# Patient Record
Sex: FEMALE | Race: WHITE | URBAN - METROPOLITAN AREA
[De-identification: names, ages, dates, MRNs, and addresses within clinical notes are randomized per-mention and may not be internally consistent; named-entity substitution may affect disease eponyms.]

---

## 2018-08-01 ENCOUNTER — OFFICE VISIT (OUTPATIENT)
Dept: OBGYN CLINIC | Age: 28
End: 2018-08-01

## 2018-08-01 VITALS — WEIGHT: 153 LBS | DIASTOLIC BLOOD PRESSURE: 62 MMHG | SYSTOLIC BLOOD PRESSURE: 118 MMHG | RESPIRATION RATE: 14 BRPM

## 2018-08-01 DIAGNOSIS — N91.2 AMENORRHEA: Primary | ICD-10-CM

## 2018-08-01 RX ORDER — NORETHINDRONE ACETATE AND ETHINYL ESTRADIOL .03; 1.5 MG/1; MG/1
TABLET ORAL
COMMUNITY

## 2018-08-01 RX ORDER — LEVOTHYROXINE SODIUM 50 UG/1
TABLET ORAL
COMMUNITY

## 2018-08-01 NOTE — PROGRESS NOTES
Ivon Qiu is a 29 y.o. female    Chief Complaint   Patient presents with    Menstrual Problem          Ivon Qiu is a 29 y.o. female who complains of  Amenorrhea associated with long term OCP use    Her current method of family planning is oral  contraceptive. The patient is sexually active. She developed this problem approximately 2 years ago. Her relevant past medical history:   History reviewed. No pertinent past medical history. History reviewed. No pertinent surgical history. Social History     Occupational History    Not on file. Social History Main Topics    Smoking status: Never Smoker    Smokeless tobacco: Never Used    Alcohol use Not on file    Drug use: Not on file    Sexual activity: Not on file     No family history on file. No Known Allergies  Prior to Admission medications    Medication Sig Start Date End Date Taking? Authorizing Provider   norethindrone ac-eth estradiol (MICROGESTIN 1.5/30, 21,) 1.5-30 mg-mcg tab Take  by mouth.    Yes Historical Provider        Review of Systems - History obtained from the patient  Constitutional: negative for weight loss, fever, night sweats  HEENT: negative for hearing loss, earache, congestion, snoring, sorethroat  CV: negative for chest pain, palpitations, edema  Resp: negative for cough, shortness of breath, wheezing  Breast: negative for breast lumps, nipple discharge, galactorrhea  GI: negative for change in bowel habits, abdominal pain, black or bloody stools  : negative for frequency, dysuria, hematuria  MSK: negative for back pain, joint pain, muscle pain  Skin: negative for itching, rash, hives  Neuro: negative for dizziness, headache, confusion, weakness  Psych: negative for anxiety, depression, change in mood  Heme/lymph: negative for bleeding, bruising, pallor      Objective:  Visit Vitals    /62    Resp 14    Wt 153 lb (69.4 kg)          PHYSICAL EXAMINATION    Constitutional  · Appearance: well-nourished, well developed, alert, in no acute distress    HENT  · Head and Face: appears normal    Neck  · Inspection/Palpation: normal appearance, no masses or tenderness  · Lymph Nodes: no lymphadenopathy present  · Thyroid: gland size normal, nontender, no nodules or masses present on palpation  ·   Breasts  · Inspection of Breasts: breasts symmetrical, no skin changes, no discharge present, nipple appearance normal, no skin retraction present  · Palpation of Breasts and Axillae: no masses present on palpation, no breast tenderness  · Axillary Lymph Nodes: no lymphadenopathy present    Gastrointestinal  · Abdominal Examination: abdomen non-tender to palpation, normal bowel sounds, no masses present  · Liver and spleen: no hepatomegaly present, spleen not palpable  · Hernias: no hernias identified    Genitourinary  · External Genitalia: normal appearance for age, no discharge present, no tenderness present, no inflammatory lesions present, no masses present, no atrophy present  · Vagina: normal vaginal vault without central or paravaginal defects, no discharge present, no inflammatory lesions present, no masses present  · Bladder: non-tender to palpation  · Urethra: appears normal  · Cervix: normal   · Uterus: normal size, shape and consistency  · Adnexa: no adnexal tenderness present, no adnexal masses present  · Perineum: perineum within normal limits, no evidence of trauma, no rashes or skin lesions present  · Anus: anus within normal limits, no hemorrhoids present  · Inguinal Lymph Nodes: no lymphadenopathy present    Skin  · General Inspection: no rash, no lesions identified    Neurologic/Psychiatric  · Mental Status:  · Orientation: grossly oriented to person, place and time  · Mood and Affect: mood normal, affect appropriate    Assessment:    amenorrhea assoc with ocp    Plan:   Reassured of this situation  Discussed all contraceptive options and their impact regarding libido  Conservative management fully reviewed and all questions answered.

## 2018-08-02 ENCOUNTER — TELEPHONE (OUTPATIENT)
Dept: OBGYN CLINIC | Age: 28
End: 2018-08-02

## 2018-08-02 NOTE — TELEPHONE ENCOUNTER
Message left at 10:08AM      29year old patient seen in the office yesterday for problem visit. Patient left a message that she wanted to make sure that her visit was not billed as a well visit. This nurse left a detailed message that her visit was coded as a problem visit. This nurse advised that the patient call back if further questions. Seda Mcbride

## 2023-08-23 LAB
ABO, EXTERNAL RESULT: NORMAL
C. TRACHOMATIS, EXTERNAL RESULT: NEGATIVE
HEP B, EXTERNAL RESULT: NEGATIVE
HIV, EXTERNAL RESULT: NONREACTIVE
N. GONORRHOEAE, EXTERNAL RESULT: NEGATIVE
RUBELLA TITER, EXTERNAL RESULT: NORMAL
T. PALLIDUM (SYPHILIS) ANTIBODY, EXTERNAL RESULT: NONREACTIVE

## 2024-03-01 LAB — GBS, EXTERNAL RESULT: NEGATIVE

## 2024-03-18 ENCOUNTER — HOSPITAL ENCOUNTER (INPATIENT)
Facility: HOSPITAL | Age: 34
LOS: 4 days | Discharge: HOME | End: 2024-03-22
Attending: OBSTETRICS & GYNECOLOGY | Admitting: STUDENT IN AN ORGANIZED HEALTH CARE EDUCATION/TRAINING PROGRAM
Payer: COMMERCIAL

## 2024-03-18 PROBLEM — E03.9 HYPOTHYROIDISM: Status: ACTIVE | Noted: 2024-03-18

## 2024-03-18 PROBLEM — O40.3XX0 POLYHYDRAMNIOS AFFECTING PREGNANCY IN THIRD TRIMESTER: Status: ACTIVE | Noted: 2024-03-18

## 2024-03-18 PROBLEM — Z3A.38 38 WEEKS GESTATION OF PREGNANCY: Status: ACTIVE | Noted: 2024-03-18

## 2024-03-18 PROBLEM — O13.3 GESTATIONAL HYPERTENSION, THIRD TRIMESTER: Status: ACTIVE | Noted: 2024-03-18

## 2024-03-18 PROBLEM — R03.0 ELEVATED BP WITHOUT DIAGNOSIS OF HYPERTENSION: Status: ACTIVE | Noted: 2024-03-18

## 2024-03-18 PROBLEM — E66.9 OBESITY (BMI 30-39.9): Status: ACTIVE | Noted: 2024-03-18

## 2024-03-18 LAB
ALBUMIN SERPL-MCNC: 3.2 G/DL (ref 3.5–5)
ALBUMIN/GLOB SERPL: 0.9 (ref 1.1–2.2)
ALP SERPL-CCNC: 119 U/L (ref 45–117)
ALT SERPL-CCNC: 19 U/L (ref 12–78)
ANION GAP SERPL CALC-SCNC: 8 MMOL/L (ref 5–15)
AST SERPL-CCNC: 18 U/L (ref 15–37)
BASOPHILS # BLD: 0.1 K/UL (ref 0–0.1)
BASOPHILS NFR BLD: 1 % (ref 0–1)
BILIRUB SERPL-MCNC: 0.2 MG/DL (ref 0.2–1)
BUN SERPL-MCNC: 9 MG/DL (ref 6–20)
BUN/CREAT SERPL: 14 (ref 12–20)
CALCIUM SERPL-MCNC: 9.4 MG/DL (ref 8.5–10.1)
CHLORIDE SERPL-SCNC: 102 MMOL/L (ref 97–108)
CO2 SERPL-SCNC: 24 MMOL/L (ref 21–32)
CREAT SERPL-MCNC: 0.64 MG/DL (ref 0.55–1.02)
CREAT UR-MCNC: 102 MG/DL
DIFFERENTIAL METHOD BLD: ABNORMAL
EOSINOPHIL # BLD: 0.1 K/UL (ref 0–0.4)
EOSINOPHIL NFR BLD: 1 % (ref 0–7)
ERYTHROCYTE [DISTWIDTH] IN BLOOD BY AUTOMATED COUNT: 13.4 % (ref 11.5–14.5)
GLOBULIN SER CALC-MCNC: 3.6 G/DL (ref 2–4)
GLUCOSE SERPL-MCNC: 94 MG/DL (ref 65–100)
HCT VFR BLD AUTO: 39.1 % (ref 35–47)
HGB BLD-MCNC: 13.5 G/DL (ref 11.5–16)
IMM GRANULOCYTES # BLD AUTO: 0.1 K/UL (ref 0–0.04)
IMM GRANULOCYTES NFR BLD AUTO: 1 % (ref 0–0.5)
LYMPHOCYTES # BLD: 2.2 K/UL (ref 0.8–3.5)
LYMPHOCYTES NFR BLD: 17 % (ref 12–49)
MCH RBC QN AUTO: 31.5 PG (ref 26–34)
MCHC RBC AUTO-ENTMCNC: 34.5 G/DL (ref 30–36.5)
MCV RBC AUTO: 91.1 FL (ref 80–99)
MONOCYTES # BLD: 0.8 K/UL (ref 0–1)
MONOCYTES NFR BLD: 6 % (ref 5–13)
NEUTS SEG # BLD: 9.2 K/UL (ref 1.8–8)
NEUTS SEG NFR BLD: 74 % (ref 32–75)
NRBC # BLD: 0 K/UL (ref 0–0.01)
NRBC BLD-RTO: 0 PER 100 WBC
PLATELET # BLD AUTO: 181 K/UL (ref 150–400)
PMV BLD AUTO: 12.2 FL (ref 8.9–12.9)
POTASSIUM SERPL-SCNC: 3.7 MMOL/L (ref 3.5–5.1)
PROT SERPL-MCNC: 6.8 G/DL (ref 6.4–8.2)
PROT UR-MCNC: 19 MG/DL (ref 0–11.9)
PROT/CREAT UR-RTO: 0.2
RBC # BLD AUTO: 4.29 M/UL (ref 3.8–5.2)
SODIUM SERPL-SCNC: 134 MMOL/L (ref 136–145)
WBC # BLD AUTO: 12.4 K/UL (ref 3.6–11)

## 2024-03-18 PROCEDURE — 7210000100 HC LABOR FEE PER 1 HR

## 2024-03-18 PROCEDURE — 59200 INSERT CERVICAL DILATOR: CPT

## 2024-03-18 PROCEDURE — 6370000000 HC RX 637 (ALT 250 FOR IP): Performed by: STUDENT IN AN ORGANIZED HEALTH CARE EDUCATION/TRAINING PROGRAM

## 2024-03-18 PROCEDURE — 86901 BLOOD TYPING SEROLOGIC RH(D): CPT

## 2024-03-18 PROCEDURE — 1100000000 HC RM PRIVATE

## 2024-03-18 PROCEDURE — 86592 SYPHILIS TEST NON-TREP QUAL: CPT

## 2024-03-18 PROCEDURE — 86850 RBC ANTIBODY SCREEN: CPT

## 2024-03-18 PROCEDURE — 80053 COMPREHEN METABOLIC PANEL: CPT

## 2024-03-18 PROCEDURE — 82570 ASSAY OF URINE CREATININE: CPT

## 2024-03-18 PROCEDURE — 36415 COLL VENOUS BLD VENIPUNCTURE: CPT

## 2024-03-18 PROCEDURE — 86900 BLOOD TYPING SEROLOGIC ABO: CPT

## 2024-03-18 PROCEDURE — 85025 COMPLETE CBC W/AUTO DIFF WBC: CPT

## 2024-03-18 PROCEDURE — 84156 ASSAY OF PROTEIN URINE: CPT

## 2024-03-18 RX ORDER — HYDROMORPHONE HYDROCHLORIDE 1 MG/ML
1 INJECTION, SOLUTION INTRAMUSCULAR; INTRAVENOUS; SUBCUTANEOUS EVERY 4 HOURS PRN
Status: DISCONTINUED | OUTPATIENT
Start: 2024-03-18 | End: 2024-03-21

## 2024-03-18 RX ORDER — ZOLPIDEM TARTRATE 5 MG/1
5 TABLET ORAL NIGHTLY PRN
Status: DISPENSED | OUTPATIENT
Start: 2024-03-18 | End: 2024-03-19

## 2024-03-18 RX ORDER — SODIUM CHLORIDE, SODIUM LACTATE, POTASSIUM CHLORIDE, AND CALCIUM CHLORIDE .6; .31; .03; .02 G/100ML; G/100ML; G/100ML; G/100ML
1000 INJECTION, SOLUTION INTRAVENOUS PRN
Status: DISCONTINUED | OUTPATIENT
Start: 2024-03-18 | End: 2024-03-21

## 2024-03-18 RX ORDER — SODIUM CHLORIDE 9 MG/ML
25 INJECTION, SOLUTION INTRAVENOUS PRN
Status: DISCONTINUED | OUTPATIENT
Start: 2024-03-18 | End: 2024-03-21

## 2024-03-18 RX ORDER — SODIUM CHLORIDE 0.9 % (FLUSH) 0.9 %
5-40 SYRINGE (ML) INJECTION PRN
Status: DISCONTINUED | OUTPATIENT
Start: 2024-03-18 | End: 2024-03-21

## 2024-03-18 RX ORDER — TERBUTALINE SULFATE 1 MG/ML
0.25 INJECTION, SOLUTION SUBCUTANEOUS
Status: ACTIVE | OUTPATIENT
Start: 2024-03-18 | End: 2024-03-19

## 2024-03-18 RX ORDER — VITAMIN B COMPLEX
1 CAPSULE ORAL DAILY
COMMUNITY

## 2024-03-18 RX ORDER — VITAMIN E (DL,TOCOPHERYL ACET) 45 MG/0.25
DROPS ORAL
COMMUNITY

## 2024-03-18 RX ORDER — ACETAMINOPHEN 325 MG/1
650 TABLET ORAL EVERY 4 HOURS PRN
Status: DISCONTINUED | OUTPATIENT
Start: 2024-03-18 | End: 2024-03-21

## 2024-03-18 RX ORDER — SODIUM CHLORIDE 0.9 % (FLUSH) 0.9 %
5-40 SYRINGE (ML) INJECTION EVERY 12 HOURS SCHEDULED
Status: DISCONTINUED | OUTPATIENT
Start: 2024-03-18 | End: 2024-03-21

## 2024-03-18 RX ORDER — SODIUM CHLORIDE, SODIUM LACTATE, POTASSIUM CHLORIDE, AND CALCIUM CHLORIDE .6; .31; .03; .02 G/100ML; G/100ML; G/100ML; G/100ML
500 INJECTION, SOLUTION INTRAVENOUS PRN
Status: DISCONTINUED | OUTPATIENT
Start: 2024-03-18 | End: 2024-03-21

## 2024-03-18 RX ORDER — LEVOTHYROXINE SODIUM 75 UG/ML
SOLUTION ORAL
COMMUNITY

## 2024-03-18 RX ADMIN — Medication 25 MCG: at 19:30

## 2024-03-18 NOTE — H&P
History & Physical    Name: Dawn Means MRN: [id number]  SSN: [id number]    YOB: 1990  Age: 33 y.o.  Sex: female      Subjective:     Chief Complaint:  IOL    HPI:  Patient presents to L&D for scheduled IOL. She is doing well. Reports good FM. Notes feeling some mild tightening/cramping in lower abd today, intermittent, no pattern. Denies VB, LOF. Also denies HA, vision changes, CP, SOB, RUQ pain, new/worsening swelling.    Last growth US 3/8: EFW 3157g/57%ile, AC 70%ile    Estimated Date of Delivery: None noted.  OB History    Para Term  AB Living   1             SAB IAB Ectopic Molar Multiple Live Births                    # Outcome Date GA Lbr Yohannes/2nd Weight Sex Delivery Anes PTL Lv   1 Current                No past medical history on file.  No past surgical history on file.  Social History     Occupational History    Not on file   Tobacco Use    Smoking status: Not on file    Smokeless tobacco: Not on file   Substance and Sexual Activity    Alcohol use: Not on file    Drug use: Not on file    Sexual activity: Not on file     No family history on file.    Allergies   Allergen Reactions    Alpha-Gal      Prior to Admission medications    Not on File        Review of Systems: Pertinent items are noted in the History of Present Illness.    Objective:     Vitals:  Vitals:    24 1734   BP: (!) 140/86   Pulse: 72         BP (!) 140/86   Pulse 72     Gen: Alert, NAD even when pau   Lungs:  Respiratory non labored. Lungs CTAB   Heart:  Regular rate and rhythm, no abnormal heart sounds.   Abd: Soft, non-tender. Gravid.   Presentation: Cephalic via V-scan   Genital: Cervix 1/70/-3   Msk: Symmetric 1+ pitting edema bilateral lower legs. No calf tenderness.      NST:  Baseline : 150 bpm  Variability: moderate  Accels: present  Decels: none  [place]: mostly irritability, irregular ctx      Impression/Plan:     Principal Problem:    38 weeks gestation of pregnancy  Active Problems:

## 2024-03-18 NOTE — PROGRESS NOTES
1710 Pt of Dr. Preston arrives ambulatory with significant other for scheduled induction. Pt is , GA 38w5d. PMH significant for GHTN and thyroid disease. Pt has Alpha-gal allergy and takes daily PNV. Pt denies LOF, VB, or contractions, endorses (+) fetal movement. Pt denies headache, blurred vision, or RUQ pain. MD notified of pt arrival. Pt oriented to room and call bell within reach.    1744 Dr. Jansen at bedside to assess pt and discuss plan of care. Vertex position confirmed via vscan.    1750 Bedside and Verbal shift change report given to ALBERT Guzman (oncoming nurse) by LANDEN Bess (offgoing nurse). Report included the following information Nurse Handoff Report, Intake/Output, MAR, and Recent Results.

## 2024-03-18 NOTE — PROGRESS NOTES
1700~  Patient arrived from home for IOL for pre E, and poly hydramnios.      1755~  Dr Jansen in to see patient, explained the types of cervical ripening.  SVE 1/70    1800~  Patient would like oral cytotec.

## 2024-03-19 ENCOUNTER — ANESTHESIA EVENT (OUTPATIENT)
Facility: HOSPITAL | Age: 34
End: 2024-03-19
Payer: COMMERCIAL

## 2024-03-19 ENCOUNTER — ANESTHESIA (OUTPATIENT)
Facility: HOSPITAL | Age: 34
End: 2024-03-19
Payer: COMMERCIAL

## 2024-03-19 LAB
ABO + RH BLD: NORMAL
BLOOD GROUP ANTIBODIES SERPL: NORMAL
RPR SER QL: NONREACTIVE
SPECIMEN EXP DATE BLD: NORMAL

## 2024-03-19 PROCEDURE — 1100000000 HC RM PRIVATE

## 2024-03-19 PROCEDURE — 2580000003 HC RX 258: Performed by: OBSTETRICS & GYNECOLOGY

## 2024-03-19 PROCEDURE — 2580000003 HC RX 258: Performed by: STUDENT IN AN ORGANIZED HEALTH CARE EDUCATION/TRAINING PROGRAM

## 2024-03-19 PROCEDURE — 59200 INSERT CERVICAL DILATOR: CPT

## 2024-03-19 PROCEDURE — 36415 COLL VENOUS BLD VENIPUNCTURE: CPT

## 2024-03-19 PROCEDURE — 6370000000 HC RX 637 (ALT 250 FOR IP): Performed by: STUDENT IN AN ORGANIZED HEALTH CARE EDUCATION/TRAINING PROGRAM

## 2024-03-19 PROCEDURE — 6370000000 HC RX 637 (ALT 250 FOR IP): Performed by: ADVANCED PRACTICE MIDWIFE

## 2024-03-19 PROCEDURE — 6360000002 HC RX W HCPCS: Performed by: OBSTETRICS & GYNECOLOGY

## 2024-03-19 PROCEDURE — C1726 CATH, BAL DIL, NON-VASCULAR: HCPCS

## 2024-03-19 PROCEDURE — 7210000100 HC LABOR FEE PER 1 HR

## 2024-03-19 RX ORDER — MISOPROSTOL 200 UG/1
400 TABLET ORAL PRN
Status: DISCONTINUED | OUTPATIENT
Start: 2024-03-19 | End: 2024-03-21

## 2024-03-19 RX ORDER — DIPHENHYDRAMINE HCL 25 MG
25 CAPSULE ORAL EVERY 4 HOURS PRN
Status: DISCONTINUED | OUTPATIENT
Start: 2024-03-19 | End: 2024-03-21

## 2024-03-19 RX ORDER — ONDANSETRON 2 MG/ML
4 INJECTION INTRAMUSCULAR; INTRAVENOUS EVERY 6 HOURS PRN
Status: DISCONTINUED | OUTPATIENT
Start: 2024-03-19 | End: 2024-03-21

## 2024-03-19 RX ORDER — METHYLERGONOVINE MALEATE 0.2 MG/ML
200 INJECTION INTRAVENOUS PRN
Status: DISCONTINUED | OUTPATIENT
Start: 2024-03-19 | End: 2024-03-21

## 2024-03-19 RX ORDER — CARBOPROST TROMETHAMINE 250 UG/ML
250 INJECTION, SOLUTION INTRAMUSCULAR PRN
Status: DISCONTINUED | OUTPATIENT
Start: 2024-03-19 | End: 2024-03-21

## 2024-03-19 RX ORDER — NALOXONE HYDROCHLORIDE 0.4 MG/ML
INJECTION, SOLUTION INTRAMUSCULAR; INTRAVENOUS; SUBCUTANEOUS PRN
Status: DISCONTINUED | OUTPATIENT
Start: 2024-03-19 | End: 2024-03-21

## 2024-03-19 RX ORDER — ONDANSETRON 4 MG/1
4 TABLET, ORALLY DISINTEGRATING ORAL EVERY 6 HOURS PRN
Status: DISCONTINUED | OUTPATIENT
Start: 2024-03-19 | End: 2024-03-21

## 2024-03-19 RX ORDER — EPHEDRINE SULFATE 50 MG/ML
10 INJECTION INTRAVENOUS
Status: DISPENSED | OUTPATIENT
Start: 2024-03-19 | End: 2024-03-20

## 2024-03-19 RX ORDER — FENTANYL/BUPIVACAINE/NS/PF 2-1250MCG
1-15 PLASTIC BAG, INJECTION (ML) INJECTION CONTINUOUS
Status: DISCONTINUED | OUTPATIENT
Start: 2024-03-19 | End: 2024-03-21

## 2024-03-19 RX ORDER — SODIUM CHLORIDE, SODIUM LACTATE, POTASSIUM CHLORIDE, CALCIUM CHLORIDE 600; 310; 30; 20 MG/100ML; MG/100ML; MG/100ML; MG/100ML
INJECTION, SOLUTION INTRAVENOUS CONTINUOUS
Status: DISCONTINUED | OUTPATIENT
Start: 2024-03-19 | End: 2024-03-21

## 2024-03-19 RX ADMIN — Medication 25 MCG: at 17:56

## 2024-03-19 RX ADMIN — SODIUM CHLORIDE, PRESERVATIVE FREE 10 ML: 5 INJECTION INTRAVENOUS at 07:55

## 2024-03-19 RX ADMIN — ACETAMINOPHEN 325 MG: 325 TABLET ORAL at 01:29

## 2024-03-19 RX ADMIN — SODIUM CHLORIDE, POTASSIUM CHLORIDE, SODIUM LACTATE AND CALCIUM CHLORIDE: 600; 310; 30; 20 INJECTION, SOLUTION INTRAVENOUS at 22:18

## 2024-03-19 RX ADMIN — Medication 25 MCG: at 04:15

## 2024-03-19 RX ADMIN — OXYTOCIN 2 MILLI-UNITS/MIN: 10 INJECTION, SOLUTION INTRAMUSCULAR; INTRAVENOUS at 22:20

## 2024-03-19 RX ADMIN — Medication 25 MCG: at 09:16

## 2024-03-19 RX ADMIN — ZOLPIDEM TARTRATE 5 MG: 5 TABLET ORAL at 01:29

## 2024-03-19 RX ADMIN — Medication 25 MCG: at 13:34

## 2024-03-19 RX ADMIN — Medication 25 MCG: at 00:06

## 2024-03-19 ASSESSMENT — PAIN SCALES - GENERAL: PAINLEVEL_OUTOF10: 4

## 2024-03-19 ASSESSMENT — PAIN DESCRIPTION - DESCRIPTORS: DESCRIPTORS: ACHING

## 2024-03-19 ASSESSMENT — PAIN DESCRIPTION - LOCATION: LOCATION: HEAD

## 2024-03-19 NOTE — PROGRESS NOTES
S: Pt seen, examined and chart reviewed.  Comfortable.    O:   Vitals:    03/19/24 0755   BP: 136/87   Pulse: 75   Resp: 16   Temp: 98 °F (36.7 °C)   SpO2: 98%     Cat I NST  An NST was performed and was reactive. The baseline FHR was 135. Moderate baseline  variability was noted. Accelerations of sufficient amplitude and duration were noted.  There were no decelerations noted.    [place] - irreg    Gen - NAD  Resp - nl effort  Abd - gravid, non-tender  Cervix - 1/90/-2    Balloon placed manually, pt tolerated well.  Given significant effacement only uterine balloon was inflated to 60cc.      A/P: G1 at 38w6d undergoing IOL for GHTN and polyhydramnios.  S/p cytotec x 3.  GBS neg.  H/o hypothyroidism, not on meds.    - reviewed options of continued cytotec +/- CRB placement  - after discussion, mutual decision is made to proceed with CRB, tolerated placement well  - cont cytotec for up to 3more doses while balloon remains in place  - monitor Bps, currently mild range

## 2024-03-19 NOTE — PROGRESS NOTES
1530 verbal report from ROZINA Arriaza RN  [id number] [facility]  1940 verbal report given to SERENITY Vu RN

## 2024-03-19 NOTE — PROGRESS NOTES
Increasing cramping  Normotensive  Cat I NST  Balloon removed intact  Cervix 6/90/-1  Last cytotec 1800  Plan to start pit 2200

## 2024-03-19 NOTE — PROGRESS NOTES
1935: Report received from KENDELL Guzman RN    0115: Pt sitting up in bed, maternal HR audible. Pt c/o headache rated 4/10 and unable to sleep. Requesting tylenol and something for sleep

## 2024-03-19 NOTE — PROGRESS NOTES
Labor Progress Note    S: Patient seen, fetal heart rate and contraction pattern evaluated. Resting comfortably in bed eating dinner. Partner at bedside.    Physical Exam:  Patient Vitals for the past 4 hrs:   Temp Pulse BP   24 1931 97.9 °F (36.6 °C) 67 128/79   24 1734 -- 72 (!) 140/86         Cervical Exam: deferred  Membranes:  Intact  Uterine Contractions:  Frequency: Irregular, mild  Fetal Heart Rate: 135s, accels present, decels absent, moderate variability      Assessment/Plan:    33 y.o.  at 38w5d IUP  IOL polyhydramnios- 36cm on 3/15, EFW 3157g/57%ile, AC 70%ile   Cat 1 tracing  GBS negative  GHTN dx on admission, labs nl, p/cr 0.2    P:  Assumed care of patient  Introduced self to patient/partner  Continue plan per Dr. Jansen- patient does not want CRB  Cytotec overnight- will re-evaluate in AM  IV pain meds/epidural prn  All questions asked/answered and patient/partner agree with plan    ROBERTA Yeager - [facility]

## 2024-03-19 NOTE — PROGRESS NOTES
0730: Verbal report from SERENITY Vu RN.  0755: Pt up to BR, returned to bed, resting comfortably.   0910: Cervical exam 1/90/-2. Plan to place garzon balloon and continue cytotec. Pt agreeable w/ plan of care.   0916: Garzon balloon placed, 60 ml in uterine balloon. Fourth dose of cytotec given (see MAR).   1101: Up to BR.  1334: Fifth dose of cytotec given (see MAR). Resting comfortably.   1530: Verbal report given to DYANA Keenan RN and AMIRA Curtis RN.

## 2024-03-20 PROCEDURE — 7220000101 HC DELIVERY VAGINAL/SINGLE

## 2024-03-20 PROCEDURE — 6360000002 HC RX W HCPCS: Performed by: STUDENT IN AN ORGANIZED HEALTH CARE EDUCATION/TRAINING PROGRAM

## 2024-03-20 PROCEDURE — 6360000002 HC RX W HCPCS: Performed by: ANESTHESIOLOGY

## 2024-03-20 PROCEDURE — 00HU33Z INSERTION OF INFUSION DEVICE INTO SPINAL CANAL, PERCUTANEOUS APPROACH: ICD-10-PCS | Performed by: ANESTHESIOLOGY

## 2024-03-20 PROCEDURE — 7100000000 HC PACU RECOVERY - FIRST 15 MIN

## 2024-03-20 PROCEDURE — 7210000100 HC LABOR FEE PER 1 HR

## 2024-03-20 PROCEDURE — 2500000003 HC RX 250 WO HCPCS: Performed by: ANESTHESIOLOGY

## 2024-03-20 PROCEDURE — 2580000003 HC RX 258: Performed by: OBSTETRICS & GYNECOLOGY

## 2024-03-20 PROCEDURE — 7100000001 HC PACU RECOVERY - ADDTL 15 MIN

## 2024-03-20 PROCEDURE — 3700000025 EPIDURAL BLOCK: Performed by: ANESTHESIOLOGY

## 2024-03-20 PROCEDURE — 3700000156 HC EPIDURAL ANESTHESIA

## 2024-03-20 PROCEDURE — 1100000000 HC RM PRIVATE

## 2024-03-20 PROCEDURE — 6370000000 HC RX 637 (ALT 250 FOR IP): Performed by: STUDENT IN AN ORGANIZED HEALTH CARE EDUCATION/TRAINING PROGRAM

## 2024-03-20 PROCEDURE — 6360000002 HC RX W HCPCS: Performed by: OBSTETRICS & GYNECOLOGY

## 2024-03-20 RX ORDER — BUPIVACAINE HYDROCHLORIDE 2.5 MG/ML
INJECTION, SOLUTION EPIDURAL; INFILTRATION; INTRACAUDAL
Status: COMPLETED
Start: 2024-03-20 | End: 2024-03-20

## 2024-03-20 RX ORDER — BUPIVACAINE HYDROCHLORIDE 2.5 MG/ML
INJECTION, SOLUTION EPIDURAL; INFILTRATION; INTRACAUDAL PRN
Status: DISCONTINUED | OUTPATIENT
Start: 2024-03-20 | End: 2024-03-20 | Stop reason: SDUPTHER

## 2024-03-20 RX ORDER — LIDOCAINE HCL/EPINEPHRINE/PF 2%-1:200K
VIAL (ML) INJECTION PRN
Status: DISCONTINUED | OUTPATIENT
Start: 2024-03-20 | End: 2024-03-20 | Stop reason: SDUPTHER

## 2024-03-20 RX ADMIN — LIDOCAINE HYDROCHLORIDE AND EPINEPHRINE 5 ML: 20; 5 INJECTION, SOLUTION EPIDURAL; INFILTRATION; INTRACAUDAL; PERINEURAL at 03:35

## 2024-03-20 RX ADMIN — ONDANSETRON 4 MG: 2 INJECTION INTRAMUSCULAR; INTRAVENOUS at 16:05

## 2024-03-20 RX ADMIN — Medication 10 ML/HR: at 13:00

## 2024-03-20 RX ADMIN — OXYTOCIN 7 MILLI-UNITS/MIN: 10 INJECTION, SOLUTION INTRAMUSCULAR; INTRAVENOUS at 14:20

## 2024-03-20 RX ADMIN — ONDANSETRON HYDROCHLORIDE 4 MG: 2 INJECTION, SOLUTION INTRAMUSCULAR; INTRAVENOUS at 23:05

## 2024-03-20 RX ADMIN — Medication 10 ML/HR: at 20:00

## 2024-03-20 RX ADMIN — Medication 10 ML/HR: at 03:56

## 2024-03-20 RX ADMIN — BUPIVACAINE HYDROCHLORIDE 3 ML: 2.5 INJECTION, SOLUTION EPIDURAL; INFILTRATION; INTRACAUDAL; PERINEURAL at 03:41

## 2024-03-20 RX ADMIN — ACETAMINOPHEN 325 MG: 325 TABLET ORAL at 11:05

## 2024-03-20 NOTE — PROGRESS NOTES
[id number]: Dr Cruz at bedside for epidural placement. Timeout completed. Pitocin paused for epidural placement due to inability to monitor FHR continuously.   0347: Left sidelying post epidural placement  0359: Right sidelying

## 2024-03-20 NOTE — PROGRESS NOTES
1935: Report received from Ree RN and Ever RN    2010: CNM in to see pt. Pt on birthing ball     0332: Pt ready for epidural. IV Fluid bolus started.     8042: Report received

## 2024-03-20 NOTE — PROGRESS NOTES
S: Doing well.  Active FM.  No complaints.  Comfortable with epidural.      O:  /68   Pulse 75   Temp 98 °F (36.7 °C) (Oral)   Resp 16   SpO2 96%     FHTs: 145, moderate variability, + accels, no decels - REACTIVE  Cat 1 tracing  [place]: contractions every 2-4 minutes apart    Cervix: 4/70/-4 at 0600 am by CNM    A/P: G1 at 39 0/7 here for IOL for severe polyhydramnios- CARRI 36cm, GHTN, Hypothyroidism, Obesity  - s/p cytotec x 6  - s/p cook  - on pitocin - will continue to titrate  - upon last check baby still very high likely due to polyhydramnios  - once OR available (not currently) will recheck cervix and consider AROM with FSE for slow leak; reviewed risk of cord prolapse with patient and she is agreeable to try slow leak and accepts risks  - CEFM  - continue to watch Bps, normal now    Barbie Lloyd MD

## 2024-03-20 NOTE — PROGRESS NOTES
0730 Bedside and Verbal shift change report given to LANDEN Bess (oncoming nurse) by SERENITY Vu (offgoing nurse). Report included the following information Nurse Handoff Report, Intake/Output, MAR, and Recent Results.    0920 Pt repositioned in Happy Baby throne position.    1045 Dr. Lloyd at bedside to assess pt and discuss plan of care. SVE 4-5/90/-2. AROM for moderate amount of clear fluid. Verbal orders received to continue titrating pitocin and position changes as appropriate. Opportunity for questions was provided. Pt verbalized understanding and agrees to plan of care.    1100 Pt turned on right side in flying cowgirl position with peanut ball between knees.    1305 Pt turned on left side in flying cowgirl position with peanut ball between knees.    1430 RN at bedside. SVE 6-7/90/-1. Pt turned on right side in exaggerated runners position with peanut ball under leg.    1443 Fluid bolus started.    1610 Pt repositioned in Happy Baby throne position.    1830 Pt turned on left side in exaggerated runners position with peanut ball under leg.    1905 RN at bedside. SVE 10/100/+1. Plan to update MD. Pt repositioned in throne position.    1940 MD updated.    1945 Bedside and Verbal shift change report given to SERENITY Vu (oncoming nurse) by LANDEN Bess (offgoing nurse). Report included the following information Nurse Handoff Report, Intake/Output, MAR, and Recent Results.

## 2024-03-20 NOTE — PROGRESS NOTES
1045 Rounding Note    AROM performed with clear return of fluid and reassuring fetal surveillance throughout.  Head well applied after AROM - cervical check 4-5/90/-2.      Barbie Lloyd MD

## 2024-03-20 NOTE — PROGRESS NOTES
[facility] Labor Progress Note     Patient: Dawn Means MRN: [id number]  SSN: [id number]    YOB: 1990  Age: 33 y.o.  Sex: female      Care assumed at     Subjective:   Patient coping well with cramping, they feel like menstrual cramps, mainly in lower abdomen and lower back.  is at bedside providing support and encouragement.    Objective:   Blood pressure 123/76, pulse 64, temperature 98 °F (36.7 °C), temperature source Oral, resp. rate 16, SpO2 98 %.  Fetal heart baseline 145, moderate variability, positive accelerations, negative decelerations, Uterine contractions q 2-6 minutes, mild to palpation, resting tone soft, Sterile Vaginal Exam: deferred, fetal presentation vertex, membranes intact     S/p cytotec x 5    S/p cook catheter    Assessment:     38w6d  Category 1 fetal heart rate tracing   IOL for severe polyhydramnios- CARRI 36cm  GHTN  Hypothyroidism  Obesity  O Positive  GBS Neg  Rubella Immune  Hep B and HIV Neg    Plan:   Introduced self to patient, reviewed POC as outlined by Dr Preston.  Pitocin at 2200, titrate to an adequate ctx pattern as fetus tolerates.   Epidural PRN for pain control.   Check cervix 4-6 hours after an adequate ctx pattern achieved, sooner with maternal or fetal indication.   Maternal and fetal monitoring per protocol.  Anticipate     ROBERTA Flowers - [facility]

## 2024-03-20 NOTE — PROGRESS NOTES
[facility] Labor Progress Note     Patient: Dawn Means MRN: [id number]  SSN: [id number]    YOB: 1990  Age: 33 y.o.  Sex: female        Subjective:   Patient coping well with contractions, they are getting stronger. Pt reports she isn't crying through them, but does have to stop and breath through them.  remains at bedside.    Objective:   Blood pressure 123/76, pulse 64, temperature 98 °F (36.7 °C), temperature source Oral, resp. rate 16, SpO2 98 %.  Fetal heart baseline 145, moderate variability, positive accelerations, negative decelerations, Uterine contractions q 2-5 minutes, moderate to palpation, resting tone soft, Sterile Vaginal Exam: deferred, fetal presentation vertex, membranes intact     Pitocin @ 2 mu/min    Assessment:     39w0d  Category 1 fetal heart rate tracing   IOL for severe polyhydramnios- CARRI 36cm  GHTN  Hypothyroidism  Obesity  O Positive  GBS Neg  Rubella Immune  Hep B and HIV Neg    Plan:   Continue to titrate pitocin to an adequate ctx pattern as fetus tolerates. Pt not yet in apparent active labor.  Plan to recheck cervix at 6am, sooner with maternal or fetal indication. If fetus well applied to cervix consider AROM at that time. Recheck cervix sooner with maternal or fetal indication.  Continue maternal and fetal monitoring per protocol  Epidural PRN  Anticipate     ROBERTA Flowers - [facility]

## 2024-03-20 NOTE — ANESTHESIA PRE PROCEDURE
Department of Anesthesiology  Preprocedure Note       Name:  Dawn Means   Age:  33 y.o.  :  1990                                          MRN:  [id number]         Date:  3/20/2024      Surgeon: * No surgeons listed *    Procedure: * No procedures listed *    Medications prior to admission:   Prior to Admission medications    Medication Sig Start Date End Date Taking? Authorizing Provider   Levothyroxine Sodium (TIROSINT-SOL) 75 MCG/ML SOLN Take by mouth   Yes ProviderEdmundo MD   Prenatal Multivit-Min-Fe-FA (PRENATAL 1 + IRON PO) Take by mouth   Yes Provider, MD Edmundo   b complex vitamins capsule Take 1 capsule by mouth daily   Yes ProviderEdmundo MD   Probiotic Product (PROBIOTIC 10 ULTRA STRENGTH) CAPS Take by mouth   Yes Provider, MD Edmundo       Current medications:    Current Facility-Administered Medications   Medication Dose Route Frequency Provider Last Rate Last Admin    lactated ringers IV soln infusion   IntraVENous Continuous Cynthia Jansen  mL/hr at 24 2218 New Bag at 24 2218    ondansetron (ZOFRAN) injection 4 mg  4 mg IntraVENous Q6H PRN Cynthia Jansen DO        Or    ondansetron (ZOFRAN-ODT) disintegrating tablet 4 mg  4 mg Oral Q6H PRN Cynthia Jansen DO        oxytocin (PITOCIN) 30 units in 500 mL infusion  87.3 kenroy-units/min IntraVENous Continuous PRN Cynthia Jansen DO        And    oxytocin (PITOCIN) 10 unit bolus from the bag  10 Units IntraVENous PRN Cynthia Jansen DO        methylergonovine (METHERGINE) injection 200 mcg  200 mcg IntraMUSCular PRN Cynthia Jansen,         carboprost (HEMABATE) injection 250 mcg  250 mcg IntraMUSCular PRN Cynthia Jansen DO        miSOPROStol (CYTOTEC) tablet 400 mcg  400 mcg Buccal PRN Cynthia Jansen DO        tranexamic acid (CYKLOKAPRON) 1,000 mg in sodium chloride 0.9 % 110 mL IVPB (mini-bag)  1,000 mg IntraVENous Once PRN

## 2024-03-20 NOTE — PROGRESS NOTES
[facility] Labor Progress Note     Patient: Dawn Means MRN: [id number]  SSN: [id number]    YOB: 1990  Age: 33 y.o.  Sex: female        Subjective:   Patient comfortable with epidural, was able to get some sleep.  has remained at bedside providing support and encouragement.    Objective:   Blood pressure 118/68, pulse 75, temperature 98 °F (36.7 °C), temperature source Oral, resp. rate 16, SpO2 96 %.  Fetal heart baseline 145, moderate variability, positive or negative accelerations, positive occasional late deceleration (cat 1 between), Uterine contractions q 2-3 minutes, moderate to palpation, resting tone soft, Sterile Vaginal Exam: 6 cm dilated/ 90 % effaced/ -3 station, cervix anterior, fetal presentation vertex, membranes intact     Pitocin at 4 mu/min    Assessment:     39w0d  Category 1 fetal heart rate tracing   IOL for severe polyhydramnios- CARRI 36cm  GHTN  Hypothyroidism  Obesity  O Positive  GBS Neg  Rubella Immune  Hep B and HIV Neg    Plan:   Discussed SVE, no significant change, but fetus too high to AROM.   Discussed possibility of AROM with a FSE during day (incase of a cord prolapse). Pt agreeable to waiting.   Continue to titrate pitocin to an adequate ctx pattern as fetus tolerates.  Continue maternal and fetal monitoring per protocol  Anticipate     ROBERTA Flowers - [facility]

## 2024-03-20 NOTE — ANESTHESIA PROCEDURE NOTES
Epidural Block    Patient location during procedure: OB  Start time: 3/20/2024 3:31 AM  End time: 3/20/2024 3:41 AM  Reason for block: labor epidural  Staffing  Anesthesiologist: Terry Cruz DO  Performed by: Terry Cruz DO  Authorized by: Terry Cruz DO    Epidural  Patient position: sitting  Prep: DuraPrep  Patient monitoring: continuous pulse ox and frequent blood pressure checks  Approach: midline  Location: L2-3  Injection technique: DANIEL air  Provider prep: mask  Needle  Needle type: Tuohy   Needle gauge: 17 G  Needle length: 3.5 in  Needle insertion depth: 7 cm  Catheter type: end hole  Catheter size: 18 G  Catheter at skin depth: 12 cm  Test dose: negativeCatheter Secured: tegaderm and tape  Assessment  Sensory level: T10  Hemodynamics: stable  Attempts: 1  Outcomes: uncomplicated and patient tolerated procedure well  Preanesthetic Checklist  Completed: patient identified, IV checked, site marked, risks and benefits discussed, surgical/procedural consents, equipment checked, pre-op evaluation, timeout performed, anesthesia consent given, oxygen available, monitors applied/VS acknowledged, fire risk safety assessment completed and verbalized and blood product R/B/A discussed and consented

## 2024-03-21 PROBLEM — O13.3 GESTATIONAL HYPERTENSION, THIRD TRIMESTER: Status: RESOLVED | Noted: 2024-03-18 | Resolved: 2024-03-21

## 2024-03-21 PROBLEM — Z3A.38 38 WEEKS GESTATION OF PREGNANCY: Status: RESOLVED | Noted: 2024-03-18 | Resolved: 2024-03-21

## 2024-03-21 PROBLEM — O40.3XX0 POLYHYDRAMNIOS AFFECTING PREGNANCY IN THIRD TRIMESTER: Status: RESOLVED | Noted: 2024-03-18 | Resolved: 2024-03-21

## 2024-03-21 PROCEDURE — 10907ZC DRAINAGE OF AMNIOTIC FLUID, THERAPEUTIC FROM PRODUCTS OF CONCEPTION, VIA NATURAL OR ARTIFICIAL OPENING: ICD-10-PCS | Performed by: OBSTETRICS & GYNECOLOGY

## 2024-03-21 PROCEDURE — 6370000000 HC RX 637 (ALT 250 FOR IP): Performed by: MIDWIFE

## 2024-03-21 PROCEDURE — 10H073Z INSERTION OF MONITORING ELECTRODE INTO PRODUCTS OF CONCEPTION, VIA NATURAL OR ARTIFICIAL OPENING: ICD-10-PCS | Performed by: OBSTETRICS & GYNECOLOGY

## 2024-03-21 PROCEDURE — 6360000002 HC RX W HCPCS: Performed by: STUDENT IN AN ORGANIZED HEALTH CARE EDUCATION/TRAINING PROGRAM

## 2024-03-21 PROCEDURE — 51702 INSERT TEMP BLADDER CATH: CPT

## 2024-03-21 PROCEDURE — 2580000003 HC RX 258: Performed by: STUDENT IN AN ORGANIZED HEALTH CARE EDUCATION/TRAINING PROGRAM

## 2024-03-21 PROCEDURE — 0KQM0ZZ REPAIR PERINEUM MUSCLE, OPEN APPROACH: ICD-10-PCS | Performed by: OBSTETRICS & GYNECOLOGY

## 2024-03-21 PROCEDURE — 4A1H74Z MONITORING OF PRODUCTS OF CONCEPTION, CARDIAC ELECTRICAL ACTIVITY, VIA NATURAL OR ARTIFICIAL OPENING: ICD-10-PCS | Performed by: OBSTETRICS & GYNECOLOGY

## 2024-03-21 PROCEDURE — 3E0P7VZ INTRODUCTION OF HORMONE INTO FEMALE REPRODUCTIVE, VIA NATURAL OR ARTIFICIAL OPENING: ICD-10-PCS | Performed by: OBSTETRICS & GYNECOLOGY

## 2024-03-21 PROCEDURE — 1120000000 HC RM PRIVATE OB

## 2024-03-21 RX ORDER — MODIFIED LANOLIN
OINTMENT (GRAM) TOPICAL PRN
Status: DISCONTINUED | OUTPATIENT
Start: 2024-03-21 | End: 2024-03-22 | Stop reason: HOSPADM

## 2024-03-21 RX ORDER — IBUPROFEN 400 MG/1
800 TABLET ORAL EVERY 8 HOURS SCHEDULED
Status: DISCONTINUED | OUTPATIENT
Start: 2024-03-21 | End: 2024-03-22 | Stop reason: HOSPADM

## 2024-03-21 RX ORDER — OXYCODONE HYDROCHLORIDE 5 MG/1
5 TABLET ORAL EVERY 6 HOURS PRN
Status: DISCONTINUED | OUTPATIENT
Start: 2024-03-21 | End: 2024-03-22 | Stop reason: HOSPADM

## 2024-03-21 RX ORDER — ACETAMINOPHEN 500 MG
1000 TABLET ORAL EVERY 8 HOURS SCHEDULED
Status: DISCONTINUED | OUTPATIENT
Start: 2024-03-21 | End: 2024-03-22 | Stop reason: HOSPADM

## 2024-03-21 RX ORDER — LEVOTHYROXINE SODIUM 0.07 MG/1
75 TABLET ORAL
Status: DISCONTINUED | OUTPATIENT
Start: 2024-03-21 | End: 2024-03-22 | Stop reason: HOSPADM

## 2024-03-21 RX ORDER — DOCUSATE SODIUM 100 MG/1
100 CAPSULE, LIQUID FILLED ORAL 2 TIMES DAILY
Status: DISCONTINUED | OUTPATIENT
Start: 2024-03-21 | End: 2024-03-22 | Stop reason: HOSPADM

## 2024-03-21 RX ORDER — IBUPROFEN 800 MG/1
800 TABLET ORAL EVERY 8 HOURS SCHEDULED
Qty: 30 TABLET | Refills: 3 | Status: SHIPPED | OUTPATIENT
Start: 2024-03-21 | End: 2024-03-24

## 2024-03-21 RX ADMIN — ACETAMINOPHEN 1000 MG: 500 TABLET ORAL at 09:12

## 2024-03-21 RX ADMIN — DOCUSATE SODIUM 100 MG: 100 CAPSULE, LIQUID FILLED ORAL at 09:12

## 2024-03-21 RX ADMIN — IBUPROFEN 800 MG: 400 TABLET, FILM COATED ORAL at 20:15

## 2024-03-21 RX ADMIN — IBUPROFEN 800 MG: 400 TABLET, FILM COATED ORAL at 02:50

## 2024-03-21 RX ADMIN — LEVOTHYROXINE SODIUM 75 MCG: 0.07 TABLET ORAL at 10:29

## 2024-03-21 RX ADMIN — IBUPROFEN 800 MG: 400 TABLET, FILM COATED ORAL at 11:15

## 2024-03-21 RX ADMIN — OXYTOCIN 87.3 MILLI-UNITS/MIN: 10 INJECTION, SOLUTION INTRAMUSCULAR; INTRAVENOUS at 00:27

## 2024-03-21 ASSESSMENT — PAIN - FUNCTIONAL ASSESSMENT
PAIN_FUNCTIONAL_ASSESSMENT: ACTIVITIES ARE NOT PREVENTED
PAIN_FUNCTIONAL_ASSESSMENT: ACTIVITIES ARE NOT PREVENTED

## 2024-03-21 ASSESSMENT — PAIN DESCRIPTION - ORIENTATION
ORIENTATION: LOWER

## 2024-03-21 ASSESSMENT — PAIN DESCRIPTION - DESCRIPTORS
DESCRIPTORS: SORE

## 2024-03-21 ASSESSMENT — PAIN DESCRIPTION - LOCATION
LOCATION: PERINEUM

## 2024-03-21 ASSESSMENT — PAIN SCALES - GENERAL
PAINLEVEL_OUTOF10: 4
PAINLEVEL_OUTOF10: 2
PAINLEVEL_OUTOF10: 2

## 2024-03-21 NOTE — DISCHARGE INSTRUCTIONS
Depression After Childbirth: Care Instructions  It's common to lose sleep, feel irritable, and cry easily during the first few days after childbirth. Hormone changes and the demands of a new baby can cause these \"baby blues.\" If these mood changes last more than 2 weeks, you may have postpartum depression. This is a medical condition that requires treatment.  If you have any of these signs, you may be depressed. See your doctor right away.    You feel very sad or hopeless and lose interest in daily activities.       You sleep too much or not enough.       You feel tired or as if you have no energy.       You eat too much or too little.       You write or talk about death.     Tips to help with postpartum depression    What you can do    Try to go to all of your counseling sessions.  Take medicines as directed.  Eat healthy foods.  Get daily exercise, such as walks.  Try to get some sunlight every day.  Avoid using alcohol or other substances.  Get as much rest as possible.  Connect with friends, and join a support group for new parents.  When should you call for help?   Call 891 if:    You feel you cannot stop from hurting yourself, your baby, or someone else.   Where to get help 24 hours a day, 7 days a week   If you or someone you know talks about suicide, self-harm, a mental health crisis, a substance use crisis, or any other kind of emotional distress, get help right away. You can:    Call the Suicide and Crisis Lifeline at 379.     Call 9-385-357-TALK (1-369.146.9668).     Text HOME to 066231 to access the Crisis Text Line.   Consider saving these numbers in your phone.  Go to [facility] for more information or to chat online.  Call your doctor now or seek immediate medical care if:    You are having trouble caring for yourself or your baby.     You hear voices.   Contact your doctor if:    You have problems with your medicines.     You do not get better as expected.   Follow-up care is a key part of

## 2024-03-21 NOTE — PROGRESS NOTES
1935: Report received from NITA Bess RN    2010: DAYSI 10/100/0. Reposition right lateral with peanut ball between feet    2045: Reposition left lateral with peanut ball between feet     2137: Start pushing    2228: pushing on right side    2242: back to lithotomy, alternating closed knee, lithotomy, and happy baby pushing     2335: CNM called to bs for delivery    2344: Delivery of baby boy!

## 2024-03-21 NOTE — DISCHARGE SUMMARY
Obstetrical Discharge Summary     Name: Dawn Means MRN: [id number]  SSN: [id number]    YOB: 1990  Age: 33 y.o.  Sex: female      Admit Date: 3/18/2024    Discharge Date: 3/22/2024     Admitting Physician: Cynthia Jansen DO     Attending Physician:  Iman Spangler,*     Admission Diagnoses: 38 weeks gestation of pregnancy [Z3A.38]  [facility]    Discharge Diagnoses:   Information for the patient's :  HANNY Means [[id number]]   @[id number]@    [facility]      Hospital Course: Normal hospital course following the delivery.  Bps were normal without medication.     Patient Instructions:   Current Discharge Medication List        START taking these medications    Details   ibuprofen (ADVIL;MOTRIN) 800 MG tablet Take 1 tablet by mouth every 8 hours for 8 doses  Qty: 30 tablet, Refills: 3           CONTINUE these medications which have NOT CHANGED    Details   Levothyroxine Sodium (TIROSINT-SOL) 75 MCG/ML SOLN Take by mouth      Prenatal Multivit-Min-Fe-FA (PRENATAL 1 + IRON PO) Take by mouth      b complex vitamins capsule Take 1 capsule by mouth daily      Probiotic Product (PROBIOTIC 10 ULTRA STRENGTH) CAPS Take by mouth             Disposition at Discharge: Home or self care    Condition at Discharge: Stable    Reference my discharge instructions.    No follow-ups on file.     Signed By:  Maria C Cottrell MD     2024

## 2024-03-21 NOTE — LACTATION NOTE
This note was copied from a baby's chart.  . Mother noticed breast changes during her pregnancy. Colostrum expressed easily. Baby is very sleepy. Latched baby on the right breast in the football hold with the nipple shield. Audible swallows heard and rhythmic suck observed. Educated mother on feeding cues, feeding on demand, offering both breasts at every feeding, feeding at least every 2 to 3 hours, and not limiting time at the breast.     Feeding Plan:  Mother will keep baby skin to skin as often as possible, feed on demand, 8-12x/day , respond to feeding cues, obtain latch, listen for audible swallowing, be aware of signs of oxytocin release/ cramping,thirst,sleepiness while breastfeeding, offer both breasts,and will not limit feedings.  Mother agrees to utilize breast massage while nursing to facilitate lactogenesis.

## 2024-03-21 NOTE — PROGRESS NOTES
In room to introduce myself to pt. Last RN exam\" 10/100/0. Will plan to labor down. Comfortable with epidural. No urge to push/pressure at this time.     FHR Cat I   Ctx q 2-3min. Strong to palpation. No tachysystole noted.

## 2024-03-21 NOTE — LACTATION NOTE
This note was copied from a baby's chart.  Infant 12 hours old at time of consult, blood sugars were stable on first 3 checks, very sleepy, brought to breast in biologic position, coaxed to latch and suck a few times.  Copious colostrum fed to infant via hand expression during attempt.  Parents educated on normal  behavior using handout.  Sleeping a lot in the first 24 hours is normal behavior.  Parents relieved.  Infant had been induced starting on Monday, was in labor for days, now needs time to recover.    Mom taught how to manually hand express her colostrum.  Emphasized the importance of providing infant with valuable colostrum as infant rests skin to skin at breast.  Aware to avoid extended periods of non-feeding.  Taught the rationale behind this low tech but highly effective evidence based practice.

## 2024-03-21 NOTE — PROGRESS NOTES
Post-Partum Day Number 1 Progress Note    Dawn Measn     Assessment: Doing well, post partum day 1    Plan:  - Continue routine postpartum and perineal care as well as maternal education.  - The risks and benefits of the circumcision  procedure and anesthesia including: bleeding, infection, variability of cosmetic results were discussed at length with the mother. She is aware that future repeat procedures may be necessary. She gives informed consent to proceed as noted and her questions are answered. Held for poor feeding today.   - Plan discharge home tomorrow.    Information for the patient's :  HANNY Means [[id number]]   Vaginal, Spontaneous  Patient doing well without significant complaint.  Voiding without difficulty, normal lochia.    Vitals:  /80   Pulse 67   Temp 97 °F (36.1 °C) (Oral)   Resp 16   SpO2 98%   Breastfeeding Unknown   Temp (24hrs), Av °F (36.7 °C), Min:97 °F (36.1 °C), Max:98.5 °F (36.9 °C)        Exam:   Patient without distress.                  Fundus firm, nontender per nursing fundal checks.                Perineum with normal lochia noted per nursing assessment.                Lower extremities are negative for pathological edema.    Labs:     Lab Results   Component Value Date/Time    WBC 12.4 2024 07:22 PM    HGB 13.5 2024 07:22 PM    HCT 39.1 2024 07:22 PM     2024 07:22 PM       No results found for this or any previous visit (from the past 24 hour(s)).

## 2024-03-22 VITALS
SYSTOLIC BLOOD PRESSURE: 124 MMHG | RESPIRATION RATE: 16 BRPM | HEART RATE: 66 BPM | OXYGEN SATURATION: 100 % | TEMPERATURE: 98 F | DIASTOLIC BLOOD PRESSURE: 71 MMHG

## 2024-03-22 PROCEDURE — 6370000000 HC RX 637 (ALT 250 FOR IP): Performed by: MIDWIFE

## 2024-03-22 RX ADMIN — ACETAMINOPHEN 1000 MG: 500 TABLET ORAL at 02:27

## 2024-03-22 RX ADMIN — ACETAMINOPHEN 1000 MG: 500 TABLET ORAL at 15:36

## 2024-03-22 RX ADMIN — LEVOTHYROXINE SODIUM 75 MCG: 0.07 TABLET ORAL at 07:23

## 2024-03-22 RX ADMIN — IBUPROFEN 800 MG: 400 TABLET, FILM COATED ORAL at 15:36

## 2024-03-22 ASSESSMENT — PAIN DESCRIPTION - DESCRIPTORS: DESCRIPTORS: CRAMPING

## 2024-03-22 ASSESSMENT — PAIN DESCRIPTION - ORIENTATION: ORIENTATION: LOWER

## 2024-03-22 ASSESSMENT — PAIN SCALES - GENERAL: PAINLEVEL_OUTOF10: 3

## 2024-03-22 ASSESSMENT — PAIN DESCRIPTION - LOCATION: LOCATION: ABDOMEN

## 2024-03-22 NOTE — ANESTHESIA POSTPROCEDURE EVALUATION
Department of Anesthesiology  Postprocedure Note    Patient: Dawn Means  MRN: [id number]  YOB: 1990  Date of evaluation: 3/21/2024    Procedure Summary       Date: 03/20/24 Room / Location:     Anesthesia Start: 0331 Anesthesia Stop: 2344    Procedure: Labor Analgesia Diagnosis:     Scheduled Providers:  Responsible Provider: Terry Cruz DO    Anesthesia Type: epidural ASA Status: 2            Anesthesia Type: No value filed.    Alexandra Phase I:      Alexandra Phase II:      Anesthesia Post Evaluation    Patient location during evaluation: bedside  Patient participation: complete - patient participated  Level of consciousness: awake  Pain score: 0  Airway patency: patent  Nausea & Vomiting: no nausea  Cardiovascular status: hemodynamically stable  Respiratory status: acceptable  Hydration status: euvolemic  Pain management: adequate    No notable events documented.

## 2024-03-22 NOTE — LACTATION NOTE
This note was copied from a baby's chart.  Mother states that baby has had a \"hard time\" latching and nursing today. Assisted mother in latching baby to the left breast with the nipple shield in the football hold. Rhythmic suck observed and audible swallows heard. Educated mother on feeding cues, cluster feeding, feeding on demand, and feeding at least every 2 to 3 hours. Mother will not limit time at the breast and will offer both breasts at every feeding. Mother will call out for IBCLC with any breastfeeding help.

## 2024-03-22 NOTE — LACTATION NOTE
This note was copied from a baby's chart.  Baby is nursing well and improved throughout hospital stay.  Deep latch maintained, mother is comfortable, baby feeding vigorously with rhythmic suck, swallow, breathe pattern, audible swallowing, and evident milk transfer, both breasts offered, baby is asleep following feeding. Baby is feeding on demand, mother's milk is in transition, baby's weight loss, voids, and stools are appropriate over past 24 hours. Mother has no further questions for lactation consultant before discharge.      Baby was scheduled to be discharged but needed phototherapy. Mother is continuing breastfeeding while receiving treatment.

## 2024-03-22 NOTE — PROGRESS NOTES
Post-Partum Day Number 2 Progress Note    Dawn Means     Assessment: Doing well, post partum day 2    GHTN: resolved    Plan:   - Discharge home today  - Follow up in office in 1 week(s) with [facility].  -  circ for male  today    Information for the patient's :  HANNY Means [[id number]]   Vaginal, Spontaneous      Subjective:  Patient doing well without significant complaint.  Voiding without difficulty, normal lochia. Ready for discharge home.    Vitals:  /79   Pulse 67   Temp 97.9 °F (36.6 °C) (Oral)   Resp 16   SpO2 98%   Breastfeeding Unknown   Temp (24hrs), Av.8 °F (36.6 °C), Min:97.6 °F (36.4 °C), Max:97.9 °F (36.6 °C)      Exam:        Patient without distress.                Fundus firm, nontender per nursing fundal checks                Perineum with normal lochia noted per nursing assessment                Lower extremities are negative for pathological edema    Labs:     Lab Results   Component Value Date/Time    WBC 12.4 2024 07:22 PM    HGB 13.5 2024 07:22 PM    HCT 39.1 2024 07:22 PM     2024 07:22 PM       No results found for this or any previous visit (from the past 24 hour(s)).

## 2024-03-23 ENCOUNTER — LACTATION ENCOUNTER (OUTPATIENT)
Facility: HOSPITAL | Age: 34
End: 2024-03-23

## 2024-03-23 NOTE — LACTATION NOTE
This note was copied from a baby's chart.  Mom states she has been using the nipple shield to get baby to nurse. He has been under phototherapy and has been sleepy.  I helped mom with a feeding this morning. We were able to get baby latched directly to the breast. He was sucking rhythmically with some swallowing. Baby continues to be very sleepy. I talked to mom about supplementing to give baby energy to keep trying to nurse.     I showed dad how to syringe feed the baby. Parents opted to give donor milk at this time. Baby took 13 cc's of donor milk after nursing.     I set mom up with the pump and showed her how to use it. She was able to pump 10 cc's of breast milk that she will give to the baby after the next feeding.